# Patient Record
Sex: FEMALE | Race: OTHER | HISPANIC OR LATINO | URBAN - METROPOLITAN AREA
[De-identification: names, ages, dates, MRNs, and addresses within clinical notes are randomized per-mention and may not be internally consistent; named-entity substitution may affect disease eponyms.]

---

## 2019-11-04 ENCOUNTER — EMERGENCY (EMERGENCY)
Facility: HOSPITAL | Age: 44
LOS: 0 days | Discharge: HOME | End: 2019-11-04
Attending: EMERGENCY MEDICINE | Admitting: EMERGENCY MEDICINE
Payer: COMMERCIAL

## 2019-11-04 VITALS
RESPIRATION RATE: 16 BRPM | DIASTOLIC BLOOD PRESSURE: 83 MMHG | OXYGEN SATURATION: 100 % | TEMPERATURE: 98 F | SYSTOLIC BLOOD PRESSURE: 136 MMHG | HEART RATE: 76 BPM

## 2019-11-04 VITALS
TEMPERATURE: 98 F | OXYGEN SATURATION: 98 % | RESPIRATION RATE: 18 BRPM | SYSTOLIC BLOOD PRESSURE: 113 MMHG | HEART RATE: 76 BPM | DIASTOLIC BLOOD PRESSURE: 73 MMHG

## 2019-11-04 DIAGNOSIS — R07.2 PRECORDIAL PAIN: ICD-10-CM

## 2019-11-04 DIAGNOSIS — R07.9 CHEST PAIN, UNSPECIFIED: ICD-10-CM

## 2019-11-04 DIAGNOSIS — R06.02 SHORTNESS OF BREATH: ICD-10-CM

## 2019-11-04 LAB
ALBUMIN SERPL ELPH-MCNC: 4.3 G/DL — SIGNIFICANT CHANGE UP (ref 3.5–5.2)
ALP SERPL-CCNC: 53 U/L — SIGNIFICANT CHANGE UP (ref 30–115)
ALT FLD-CCNC: 11 U/L — SIGNIFICANT CHANGE UP (ref 0–41)
ANION GAP SERPL CALC-SCNC: 12 MMOL/L — SIGNIFICANT CHANGE UP (ref 7–14)
AST SERPL-CCNC: 16 U/L — SIGNIFICANT CHANGE UP (ref 0–41)
BASOPHILS # BLD AUTO: 0.04 K/UL — SIGNIFICANT CHANGE UP (ref 0–0.2)
BASOPHILS NFR BLD AUTO: 0.5 % — SIGNIFICANT CHANGE UP (ref 0–1)
BILIRUB SERPL-MCNC: <0.2 MG/DL — SIGNIFICANT CHANGE UP (ref 0.2–1.2)
BUN SERPL-MCNC: 13 MG/DL — SIGNIFICANT CHANGE UP (ref 10–20)
CALCIUM SERPL-MCNC: 9.3 MG/DL — SIGNIFICANT CHANGE UP (ref 8.5–10.1)
CHLORIDE SERPL-SCNC: 104 MMOL/L — SIGNIFICANT CHANGE UP (ref 98–110)
CO2 SERPL-SCNC: 24 MMOL/L — SIGNIFICANT CHANGE UP (ref 17–32)
CREAT SERPL-MCNC: 1 MG/DL — SIGNIFICANT CHANGE UP (ref 0.7–1.5)
EOSINOPHIL # BLD AUTO: 0.11 K/UL — SIGNIFICANT CHANGE UP (ref 0–0.7)
EOSINOPHIL NFR BLD AUTO: 1.5 % — SIGNIFICANT CHANGE UP (ref 0–8)
GLUCOSE SERPL-MCNC: 106 MG/DL — HIGH (ref 70–99)
HCT VFR BLD CALC: 36.5 % — LOW (ref 37–47)
HGB BLD-MCNC: 12 G/DL — SIGNIFICANT CHANGE UP (ref 12–16)
IMM GRANULOCYTES NFR BLD AUTO: 0.3 % — SIGNIFICANT CHANGE UP (ref 0.1–0.3)
LYMPHOCYTES # BLD AUTO: 2.4 K/UL — SIGNIFICANT CHANGE UP (ref 1.2–3.4)
LYMPHOCYTES # BLD AUTO: 32.7 % — SIGNIFICANT CHANGE UP (ref 20.5–51.1)
MCHC RBC-ENTMCNC: 31.4 PG — HIGH (ref 27–31)
MCHC RBC-ENTMCNC: 32.9 G/DL — SIGNIFICANT CHANGE UP (ref 32–37)
MCV RBC AUTO: 95.5 FL — SIGNIFICANT CHANGE UP (ref 81–99)
MONOCYTES # BLD AUTO: 0.77 K/UL — HIGH (ref 0.1–0.6)
MONOCYTES NFR BLD AUTO: 10.5 % — HIGH (ref 1.7–9.3)
NEUTROPHILS # BLD AUTO: 4 K/UL — SIGNIFICANT CHANGE UP (ref 1.4–6.5)
NEUTROPHILS NFR BLD AUTO: 54.5 % — SIGNIFICANT CHANGE UP (ref 42.2–75.2)
NRBC # BLD: 0 /100 WBCS — SIGNIFICANT CHANGE UP (ref 0–0)
PLATELET # BLD AUTO: 366 K/UL — SIGNIFICANT CHANGE UP (ref 130–400)
POTASSIUM SERPL-MCNC: 4.9 MMOL/L — SIGNIFICANT CHANGE UP (ref 3.5–5)
POTASSIUM SERPL-SCNC: 4.9 MMOL/L — SIGNIFICANT CHANGE UP (ref 3.5–5)
PROT SERPL-MCNC: 7.2 G/DL — SIGNIFICANT CHANGE UP (ref 6–8)
RBC # BLD: 3.82 M/UL — LOW (ref 4.2–5.4)
RBC # FLD: 13 % — SIGNIFICANT CHANGE UP (ref 11.5–14.5)
SODIUM SERPL-SCNC: 140 MMOL/L — SIGNIFICANT CHANGE UP (ref 135–146)
TROPONIN T SERPL-MCNC: <0.01 NG/ML — SIGNIFICANT CHANGE UP
WBC # BLD: 7.34 K/UL — SIGNIFICANT CHANGE UP (ref 4.8–10.8)
WBC # FLD AUTO: 7.34 K/UL — SIGNIFICANT CHANGE UP (ref 4.8–10.8)

## 2019-11-04 PROCEDURE — 71046 X-RAY EXAM CHEST 2 VIEWS: CPT | Mod: 26

## 2019-11-04 PROCEDURE — 93010 ELECTROCARDIOGRAM REPORT: CPT

## 2019-11-04 PROCEDURE — 99285 EMERGENCY DEPT VISIT HI MDM: CPT

## 2019-11-04 NOTE — ED PROVIDER NOTE - PATIENT PORTAL LINK FT
You can access the FollowMyHealth Patient Portal offered by [facility] by registering at the following website: [email or web address]. By joining [employer]’s FollowMyHealth portal, you will also be able to view your health information using other applications (apps) compatible with our system.

## 2019-11-04 NOTE — ED PROVIDER NOTE - CARE PROVIDER_API CALL
Dixon Denny)  Cardiovascular Disease; Internal Medicine  [address] [place] [address], [facility] [address]  [place] [address]  Phone: [phone]  Fax: [phone]  Follow Up Time: 1-3 Days    Say Manrique)  Cardiovascular Disease; Internal Medicine  [address] [place] [address], [place] [address]  Phone: [phone]  Fax: [phone]  Follow Up Time: 1-3 Days    Guille Rasmussen)  Cardiovascular Disease; Internal Medicine; Interventional Cardiology  [address] [place] [address], [facility] [address]  [place] [address]  Phone: [phone]  Fax: [phone]  Follow Up Time: 1-3 Days

## 2019-11-04 NOTE — ED ADULT NURSE NOTE - OBJECTIVE STATEMENT
Pt with C/O chest pain on and off from today in AM ,denies chest pain at this time no N/V no abdomen pain

## 2019-11-04 NOTE — ED PROVIDER NOTE - PROVIDER TOKENS
PROVIDER:[TOKEN:[[id number]],FOLLOWUP:[1-3 Days]],PROVIDER:[TOKEN:[[id number]],FOLLOWUP:[1-3 Days]],PROVIDER:[TOKEN:[[id number]],FOLLOWUP:[1-3 Days]]

## 2019-11-04 NOTE — ED PROVIDER NOTE - NS ED ROS FT
Eyes:  No visual changes, eye pain or discharge.  ENMT:  No hearing changes, pain, no sore throat or runny nose, no difficulty swallowing  Cardiac:  +chest pain, SOB . no edema.  Respiratory:  No cough or respiratory distress.    GI:  No nausea, vomiting, diarrhea or abdominal pain.  :  No dysuria, frequency or burning.  MS:  No myalgia, muscle weakness, joint pain or back pain.  Neuro:  No headache or weakness.  No LOC.  Skin:  No skin rash.   Endocrine: No history of thyroid disease or diabetes.

## 2019-11-04 NOTE — ED ADULT NURSE REASSESSMENT NOTE - NS ED NURSE REASSESS COMMENT FT1
Pt reassessed remain stable denies no chest pain no SOB no N/V no dizziness ambulate steady , pt is seen evaluate by Ed attending clear to go home ready to be D.C with family members NAD noted ambulate steady . Pt reassessed remain stable denies no chest pain no SOB no N/V no dizziness ambulate steady , pt is seen evaluate by Ed attending sign AMA sheet to go home ready to be D.C with family members NAD noted ambulate steady .

## 2019-11-04 NOTE — ED PROVIDER NOTE - OBJECTIVE STATEMENT
44yF with no pmh nonsmoker, +FH MI in father <55, p/w cp constant, waxing/waning X 1 day, starting suddenly about 10 min after waking, a/w mild sob, constant, mild moderate severity nonradiating substernal. no cough fever chills back pain n/v diaphoresis. perc negative.

## 2019-11-04 NOTE — ED PROVIDER NOTE - CARE PROVIDERS DIRECT ADDRESSES
[email or web address]

## 2019-11-04 NOTE — ED PROVIDER NOTE - ATTENDING CONTRIBUTION TO CARE
44F no pmh, never smoker, father had CABG in late 50s was a smoker, p/w CP/sob since last night. substernal pressure/tightness constant nonradiating while walking to bathroom. still present at rest. nonradiating. sob assoc. no n/v, diaphoresis. no fever, cough. no trauma. no le edema, le pain, immobilization, hormones, hemoptysis. no tearing back pain. no prior cardiac work up. pt requesting dc home.    on exam, AFVSS, well edward nad, ncat, eomi, perrla, mmm, lctab, rrr nl s1s2 no mrg, abd soft ntnd, aaox3, no focal deficits, no le edema or calf ttp,     a/p; CP/sob, r/o ACS. PERC neg. will do labs, ekg/trop CXR,  re-eval

## 2022-09-08 NOTE — ED PROVIDER NOTE - CLINICAL SUMMARY MEDICAL DECISION MAKING FREE TEXT BOX
ekg/trop neg, pt refusing to stay for obs for acs r/o and provocative testing. also refusing to stay for repeat ekg/trop. wants to sign out ama. understands risk of death,  mi, severe disability. has capacity to refuse. no sign of intoxication, psychosis. understands can come back anytime if changes her mind. will f/u cards/pmd as outpt 1-2 days, strict return precautions provided.     Patient is awake/alert/interactive with normal mental status and normal neurologic function. Patient reports no SI/HI and demonstrates normal thought processes with no evidence of intoxication, delirium, delusions or hallucinations. Patient requesting to leave against medical advice at this time. Advised patient of potential risks of leaving AMA which include potential disability or death. Attempted to convince patient to stay and continue work up/treatment and patient refused. Patient has capacity to make medical decisions at this time and will be signed out AMA. Patient instructed to follow up with his primary care provider as an outpatient and is aware they can return to the emergency department at any time for evaluation.
[Negative] : Heme/Lymph